# Patient Record
Sex: FEMALE | Race: WHITE | NOT HISPANIC OR LATINO | Employment: OTHER | URBAN - METROPOLITAN AREA
[De-identification: names, ages, dates, MRNs, and addresses within clinical notes are randomized per-mention and may not be internally consistent; named-entity substitution may affect disease eponyms.]

---

## 2021-03-31 DIAGNOSIS — Z23 ENCOUNTER FOR IMMUNIZATION: ICD-10-CM

## 2023-11-20 ENCOUNTER — TELEPHONE (OUTPATIENT)
Dept: URGENT CARE | Facility: CLINIC | Age: 68
End: 2023-11-20

## 2023-11-20 ENCOUNTER — APPOINTMENT (OUTPATIENT)
Dept: URGENT CARE | Facility: CLINIC | Age: 68
End: 2023-11-20
Payer: COMMERCIAL

## 2023-11-20 ENCOUNTER — OFFICE VISIT (OUTPATIENT)
Dept: URGENT CARE | Facility: CLINIC | Age: 68
End: 2023-11-20
Payer: COMMERCIAL

## 2023-11-20 ENCOUNTER — APPOINTMENT (OUTPATIENT)
Dept: RADIOLOGY | Facility: CLINIC | Age: 68
End: 2023-11-20
Payer: COMMERCIAL

## 2023-11-20 VITALS
HEART RATE: 93 BPM | OXYGEN SATURATION: 98 % | RESPIRATION RATE: 18 BRPM | SYSTOLIC BLOOD PRESSURE: 142 MMHG | DIASTOLIC BLOOD PRESSURE: 90 MMHG | TEMPERATURE: 97.5 F

## 2023-11-20 DIAGNOSIS — R06.2 WHEEZING: ICD-10-CM

## 2023-11-20 DIAGNOSIS — R06.2 WHEEZING: Primary | ICD-10-CM

## 2023-11-20 PROCEDURE — 99213 OFFICE O/P EST LOW 20 MIN: CPT | Performed by: PHYSICIAN ASSISTANT

## 2023-11-20 PROCEDURE — 71046 X-RAY EXAM CHEST 2 VIEWS: CPT

## 2023-11-20 PROCEDURE — S9083 URGENT CARE CENTER GLOBAL: HCPCS | Performed by: PHYSICIAN ASSISTANT

## 2023-11-20 PROCEDURE — G0463 HOSPITAL OUTPT CLINIC VISIT: HCPCS | Performed by: PHYSICIAN ASSISTANT

## 2023-11-20 RX ORDER — AZITHROMYCIN 250 MG/1
TABLET, FILM COATED ORAL
Qty: 6 TABLET | Refills: 0 | Status: SHIPPED | OUTPATIENT
Start: 2023-11-20 | End: 2023-11-24

## 2023-11-20 RX ORDER — ALBUTEROL SULFATE 90 UG/1
2 AEROSOL, METERED RESPIRATORY (INHALATION) EVERY 6 HOURS PRN
Qty: 8.5 G | Refills: 0 | Status: SHIPPED | OUTPATIENT
Start: 2023-11-20

## 2023-11-20 RX ORDER — MECOBALAMIN 5000 MCG
15 TABLET,DISINTEGRATING ORAL DAILY
COMMUNITY

## 2023-11-20 RX ORDER — PREDNISONE 10 MG/1
10 TABLET ORAL DAILY
Qty: 5 TABLET | Refills: 0 | Status: SHIPPED | OUTPATIENT
Start: 2023-11-20 | End: 2023-11-25

## 2023-11-20 RX ORDER — CETIRIZINE HYDROCHLORIDE 5 MG/1
5 TABLET, CHEWABLE ORAL DAILY
COMMUNITY

## 2023-11-20 NOTE — PROGRESS NOTES
[facility]        NAME: Kandace Roque is a 76 y.o. female  : 1955    MRN: [id number]  DATE: 2023  TIME: 11:21 AM    Assessment and Plan   Wheezing [R06.2]  1. Wheezing  XR chest pa & lateral    albuterol (ProAir HFA) 90 mcg/act inhaler    predniSONE 10 mg tablet    azithromycin (ZITHROMAX) 250 mg tablet          Chest Xray - possible pneumonia pending radiology report. Patient Instructions     Patient was educated on inhaler and steroids. Patient was told to not take OTC anti-inflammatories while on steroids. Any chest pain or shortness of breath go directly to ED. Patient was told antibiotics were sent to pharmacy. Patient was told if wheezing persist today to go directly to ED    Chief Complaint     Chief Complaint   Patient presents with    Cold Like Symptoms     Pt c/o wheezing with onset of symptoms this morning. Denies any other symptoms. Managing symptoms with warm shower, and 1 puff of Symbicort inhaler from old script. History of Present Illness       Patient is here today reporting she was drinking coffee and noted wheezing and shortness of breath. Denies any recent illness. Denies any current chest pain. Denies any allergies to medications. Denies any cardiac history. Denies any dizziness. Denies any numbness or tingling down legs. Denies any pain in her chest when lying down. Patient reports she has not ate today. Denies any trouble swallowing liquid. Review of Systems   Review of Systems   Constitutional: Negative. HENT: Negative. Respiratory:  Positive for shortness of breath and wheezing. Cardiovascular: Negative. Psychiatric/Behavioral: Negative.            Current Medications       Current Outpatient Medications:     albuterol (ProAir HFA) 90 mcg/act inhaler, Inhale 2 puffs every 6 (six) hours as needed for wheezing, Disp: 8.5 g, Rfl: 0    azithromycin (ZITHROMAX) 250 mg tablet, Take 2 tablets today then 1 tablet daily x 4 days, Disp: 6 tablet, Rfl: 0    cetirizine (ZyrTEC) 5 MG chewable tablet, Chew 5 mg daily, Disp: , Rfl:     lansoprazole (PREVACID) 15 mg capsule, Take 15 mg by mouth daily, Disp: , Rfl:     predniSONE 10 mg tablet, Take 1 tablet (10 mg total) by mouth daily for 5 days, Disp: 5 tablet, Rfl: 0    Current Allergies     Allergies as of 11/20/2023    (No Known Allergies)            The following portions of the patient's history were reviewed and updated as appropriate: allergies, current medications, past family history, past medical history, past social history, past surgical history and problem list.     History reviewed. No pertinent past medical history. History reviewed. No pertinent surgical history. History reviewed. No pertinent family history. Medications have been verified. Objective   /90 (BP Location: Right arm, Patient Position: Sitting)   Pulse 93   Temp 97.5 °F (36.4 °C)   Resp 18   SpO2 98%   No LMP recorded. Physical Exam     Physical Exam  Vitals and nursing note reviewed. Constitutional:       Appearance: Normal appearance. HENT:      Head: Normocephalic. Right Ear: Tympanic membrane, ear canal and external ear normal.      Left Ear: Tympanic membrane, ear canal and external ear normal.      Mouth/Throat:      Mouth: Mucous membranes are moist.   Eyes:      Pupils: Pupils are equal, round, and reactive to light. Cardiovascular:      Rate and Rhythm: Normal rate and regular rhythm. Heart sounds: Normal heart sounds. Pulmonary:      Breath sounds: Wheezing present. Neurological:      General: No focal deficit present. Mental Status: She is alert and oriented to person, place, and time.    Psychiatric:         Mood and Affect: Mood normal.         Behavior: Behavior normal.

## 2023-11-20 NOTE — PATIENT INSTRUCTIONS
Patient was educated on inhaler and steroids. Patient was told to not take OTC anti-inflammatories while on steroids. Any chest pain or shortness of breath go directly to ED. Patient was told antibiotics were sent to pharmacy. Patient was told if wheezing persist today to go directly to ED    Wheezing   WHAT YOU NEED TO KNOW:   Wheezing happens when air flows through a narrowed or blocked airway. Wheezing can happen when you breathe in, breathe out, or both. Wheezes may sound like a whistle, squeal, groan, or creak. Wheezes may also sound musical or high-pitched. DISCHARGE INSTRUCTIONS:   Call your local emergency number (911 in the [facility]) if:   You feel lightheaded, short of breath, and have chest pain. You are dizzy, confused, or feel faint. You have sudden trouble breathing. Your throat feels like it is swelling or feels tight. Return to the emergency department if:   You cough up blood. Call your doctor if:   You have a fever. Your wheezing does not get better or it gets worse. You have questions or concerns about your condition or care. Medicines:   Medicines  may help open your airways, decrease your symptoms, or treat an infection. They may be given as an inhaler, nebulizer, or pill. Take your medicine as directed. Contact your healthcare provider if you think your medicine is not helping or if you have side effects. Tell your provider if you are allergic to any medicine. Keep a list of the medicines, vitamins, and herbs you take. Include the amounts, and when and why you take them. Bring the list or the pill bottles to follow-up visits. Carry your medicine list with you in case of an emergency. Self care:   Return to your usual activity as directed. You may need to limit certain activities. Ask your healthcare provider when it is okay to resume activity. Take deep breaths and cough several times a day.   This will decrease your risk for a lung infection and help decrease wheezing. Take a deep breath and hold it for as long as you can. Let the air out and then cough strongly. Deep breaths help open your airway. You may be given an incentive spirometer to help you take deep breaths. Put the plastic piece in your mouth and take a slow, deep breath in, then let the air out and cough. Repeat these steps 10 times every hour. Drink liquids as directed. You may need to drink more liquids than usual to thin your mucus and prevent dehydration. Ask how much liquid to drink each day and which liquids are best for you. Prevent wheezing:   Do not smoke. Nicotine and other chemicals in cigarettes and cigars can cause lung damage. Ask your healthcare provider for information if you currently smoke and need help to quit. E-cigarettes or smokeless tobacco still contain nicotine. Talk to your healthcare provider before you use these products. Avoid allergy triggers , such as animals, grass, pollen, or dust.    Follow up with your doctor as directed: You may be referred to a specialist. Write down your questions so you remember to ask them during your visits. © Copyright Marta Trejo 2023 Information is for End User's use only and may not be sold, redistributed or otherwise used for commercial purposes. The above information is an  only. It is not intended as medical advice for individual conditions or treatments. Talk to your doctor, nurse or pharmacist before following any medical regimen to see if it is safe and effective for you.

## 2025-07-22 ENCOUNTER — OFFICE VISIT (OUTPATIENT)
Dept: URGENT CARE | Facility: CLINIC | Age: 70
End: 2025-07-22
Payer: COMMERCIAL

## 2025-07-22 ENCOUNTER — APPOINTMENT (OUTPATIENT)
Dept: RADIOLOGY | Facility: CLINIC | Age: 70
End: 2025-07-22
Attending: PHYSICIAN ASSISTANT
Payer: COMMERCIAL

## 2025-07-22 VITALS
RESPIRATION RATE: 16 BRPM | SYSTOLIC BLOOD PRESSURE: 170 MMHG | HEART RATE: 92 BPM | DIASTOLIC BLOOD PRESSURE: 86 MMHG | OXYGEN SATURATION: 96 % | TEMPERATURE: 98.9 F

## 2025-07-22 DIAGNOSIS — M25.532 LEFT WRIST PAIN: ICD-10-CM

## 2025-07-22 DIAGNOSIS — M65.932 TENOSYNOVITIS OF LEFT WRIST: Primary | ICD-10-CM

## 2025-07-22 PROCEDURE — 99213 OFFICE O/P EST LOW 20 MIN: CPT | Performed by: PHYSICIAN ASSISTANT

## 2025-07-22 PROCEDURE — G0463 HOSPITAL OUTPT CLINIC VISIT: HCPCS | Performed by: PHYSICIAN ASSISTANT

## 2025-07-22 PROCEDURE — S9083 URGENT CARE CENTER GLOBAL: HCPCS | Performed by: PHYSICIAN ASSISTANT

## 2025-07-22 PROCEDURE — 73110 X-RAY EXAM OF WRIST: CPT

## 2025-07-22 NOTE — ASSESSMENT & PLAN NOTE
Orders:    XR wrist 3+ vw left; Future    Cock Up Wrist Splint    Placed in prefabricated thumb spica splint by RN.

## 2025-07-22 NOTE — PROGRESS NOTES
[facility]  Name: Carolynn Garza      : 1955      MRN: [id number]  Encounter Provider: Payam Haider PA-C  Encounter Date: 2025   Encounter department: [facility]  :  Assessment & Plan  Tenosynovitis of left wrist    Orders:    XR wrist 3+ vw left; Future    Cock Up Wrist Splint    Placed in prefabricated thumb spica splint by RN.      Patient Instructions  Follow up with PCP in 3-5 days.  Proceed to  ER if symptoms worsen.    If tests are performed, our office will contact you with results only if changes need to made to the care plan discussed with you at the visit. You can review your full results on [facility].    Chief Complaint:   Chief Complaint   Patient presents with    Wrist Pain     Patient with L wrist pain since yesterday. Patient states she was carrying some groceries and unpacking them. Patient states she does not recall and exact suddden pain. Patient now with some swelling to the L wrist. Patient has tried cold and heat but nothing has been helping.      History of Present Illness   Patient presents with left wrist pain at the base of the thumb starting yesterday after carrying groceries back and unpacking them.  Does not recall specific injury or event start the symptoms just over the pain afterwards.  Pain persist since then.  Some swelling over the area.  Denies bruising, numbness or tingling.    Wrist Pain   Pertinent negatives include no numbness.         Review of Systems   Musculoskeletal:  Positive for arthralgias and joint swelling.   Skin:  Negative for color change.   Neurological:  Negative for weakness and numbness.     Past Medical History   Past Medical History[1]  Past Surgical History[2]  Family History[3]  she reports that she has never smoked. She has been exposed to tobacco smoke. She has never used smokeless tobacco.  Current Outpatient Medications   Medication Instructions    albuterol (ProAir HFA) 90 mcg/act inhaler 2 puffs,  "Inhalation, Every 6 hours PRN    cetirizine (ZYRTEC) 5 mg, Daily    lansoprazole (PREVACID) 15 mg, Daily   Allergies[4]     Objective   /86   Pulse 92   Temp 98.9 °F (37.2 °C)   Resp 16   SpO2 96%      Physical Exam  Constitutional:       Appearance: Normal appearance.     Cardiovascular:      Pulses: Normal pulses.     Musculoskeletal:         General: Tenderness present. No swelling. Normal range of motion.      Comments: Full active range of motion about 5 muscle strength of the left elbow/wrist/hand.  Neurovascularly intact distally.  Tenderness to palpation over the radial aspect of the left wrist.  No ecchymosis, swelling or deformity noted.  Finklestein's test positive     Skin:     General: Skin is warm.      Capillary Refill: Capillary refill takes less than 2 seconds.      Findings: No bruising.     Neurological:      Mental Status: She is alert.         Portions of the record may have been created with voice recognition software.  Occasional wrong word or \"sound a like\" substitutions may have occurred due to the inherent limitations of voice recognition software.  Read the chart carefully and recognize, using context, where substitutions have occurred.       [1] No past medical history on file.  [2] No past surgical history on file.  [3] No family history on file.  [4] No Known Allergies    "